# Patient Record
(demographics unavailable — no encounter records)

---

## 2025-05-22 NOTE — HISTORY OF PRESENT ILLNESS
[de-identified] : JOVITA GREENBERG is a 92 year M who presents today to f/u on his HTN. He has a chronic level lchronic anemia for which I advised a hematology consult. He is now seeing DR. York @ [facility].- CBC checked 3X - told of stable anemia - just monitoring for now.  He has a 17 lb weightloss since our first visit 8 mos ago. He now tells me that he has lost alot of weight over the last 1.5 yrs. He says he used to weigh 190 lbs. He did not mention the weightloss to me at our first visit. When I asked, he says he did not tell this to the hematologist @ [facility] either.  He feels well and denies and bowel or bladder symptoms. He denies any SOB/BERTRAND. He has a occasional cough, that attributes to Post Nasal Drip.

## 2025-05-22 NOTE — HISTORY OF PRESENT ILLNESS
[de-identified] : JOVITA GREENBERG is a 92 year M who presents today to f/u on his HTN. He has a chronic level lchronic anemia for which I advised a hematology consult. He is now seeing DR. York @ [facility].- CBC checked 3X - told of stable anemia - just monitoring for now.  He has a 17 lb weightloss since our first visit 8 mos ago. He now tells me that he has lost alot of weight over the last 1.5 yrs. He says he used to weigh 190 lbs. He did not mention the weightloss to me at our first visit. When I asked, he says he did not tell this to the hematologist @ [facility] either.  He feels well and denies and bowel or bladder symptoms. He denies any SOB/BERTRAND. He has a occasional cough, that attributes to Post Nasal Drip.

## 2025-05-22 NOTE — PHYSICAL EXAM
[Normal Sclera/Conjunctiva] : normal sclera/conjunctiva [No JVD] : no jugular venous distention [No Lymphadenopathy] : no lymphadenopathy [Supple] : supple [Normal] : normal rate, regular rhythm, normal S1 and S2 and no murmur heard [No Abdominal Bruit] : a ~M bruit was not heard ~T in the abdomen [No Edema] : there was no peripheral edema [Soft] : abdomen soft [Non Tender] : non-tender [Non-distended] : non-distended [Normal Supraclavicular Nodes] : no supraclavicular lymphadenopathy [Normal Anterior Cervical Nodes] : no anterior cervical lymphadenopathy [No CVA Tenderness] : no CVA  tenderness [No Spinal Tenderness] : no spinal tenderness [Grossly Normal Strength/Tone] : grossly normal strength/tone [No Focal Deficits] : no focal deficits [Normal Gait] : normal gait [Speech Grossly Normal] : speech grossly normal [Normal Affect] : the affect was normal [Alert and Oriented x3] : oriented to person, place, and time [Normal Mood] : the mood was normal [de-identified] : both shoulders with erythematous, scaly lesion ? pressure sores -

## 2025-05-22 NOTE — PHYSICAL EXAM
Called Dr Amando Santamaria via cell and left message to call the [facility]  02/06/23 [id number] [Normal Sclera/Conjunctiva] : normal sclera/conjunctiva [No JVD] : no jugular venous distention [No Lymphadenopathy] : no lymphadenopathy [Supple] : supple [Normal] : normal rate, regular rhythm, normal S1 and S2 and no murmur heard [No Abdominal Bruit] : a ~M bruit was not heard ~T in the abdomen [No Edema] : there was no peripheral edema [Soft] : abdomen soft [Non Tender] : non-tender [Non-distended] : non-distended [Normal Supraclavicular Nodes] : no supraclavicular lymphadenopathy [Normal Anterior Cervical Nodes] : no anterior cervical lymphadenopathy [No CVA Tenderness] : no CVA  tenderness [No Spinal Tenderness] : no spinal tenderness [Grossly Normal Strength/Tone] : grossly normal strength/tone [No Focal Deficits] : no focal deficits [Normal Gait] : normal gait [Speech Grossly Normal] : speech grossly normal [Normal Affect] : the affect was normal [Alert and Oriented x3] : oriented to person, place, and time [Normal Mood] : the mood was normal [de-identified] : both shoulders with erythematous, scaly lesion ? pressure sores -